# Patient Record
Sex: MALE
[De-identification: names, ages, dates, MRNs, and addresses within clinical notes are randomized per-mention and may not be internally consistent; named-entity substitution may affect disease eponyms.]

---

## 2017-03-21 ENCOUNTER — LAB SERVICES (OUTPATIENT)
Dept: OTHER | Age: 22
End: 2017-03-21

## 2017-03-21 ENCOUNTER — CHARTING TRANS (OUTPATIENT)
Dept: OTHER | Age: 22
End: 2017-03-21

## 2017-03-21 LAB — RAPID STREP GROUP A: POSITIVE

## 2018-11-05 VITALS
DIASTOLIC BLOOD PRESSURE: 77 MMHG | BODY MASS INDEX: 24.79 KG/M2 | WEIGHT: 210 LBS | SYSTOLIC BLOOD PRESSURE: 134 MMHG | HEIGHT: 77 IN | HEART RATE: 60 BPM | TEMPERATURE: 98.5 F | RESPIRATION RATE: 16 BRPM

## 2024-05-01 PROBLEM — Z00.00 ENCOUNTER FOR PREVENTIVE HEALTH EXAMINATION: Status: ACTIVE | Noted: 2024-05-01

## 2024-05-02 ENCOUNTER — NON-APPOINTMENT (OUTPATIENT)
Age: 29
End: 2024-05-02

## 2024-05-02 ENCOUNTER — APPOINTMENT (OUTPATIENT)
Dept: OTOLARYNGOLOGY | Facility: CLINIC | Age: 29
End: 2024-05-02
Payer: COMMERCIAL

## 2024-05-02 VITALS
WEIGHT: 220 LBS | HEIGHT: 77 IN | DIASTOLIC BLOOD PRESSURE: 66 MMHG | SYSTOLIC BLOOD PRESSURE: 121 MMHG | HEART RATE: 53 BPM | BODY MASS INDEX: 25.98 KG/M2 | TEMPERATURE: 97.9 F | OXYGEN SATURATION: 97 %

## 2024-05-02 DIAGNOSIS — Z78.9 OTHER SPECIFIED HEALTH STATUS: ICD-10-CM

## 2024-05-02 DIAGNOSIS — F17.200 NICOTINE DEPENDENCE, UNSPECIFIED, UNCOMPLICATED: ICD-10-CM

## 2024-05-02 DIAGNOSIS — H92.02 OTALGIA, LEFT EAR: ICD-10-CM

## 2024-05-02 DIAGNOSIS — H69.92 UNSPECIFIED EUSTACHIAN TUBE DISORDER, LEFT EAR: ICD-10-CM

## 2024-05-02 PROCEDURE — 31231 NASAL ENDOSCOPY DX: CPT

## 2024-05-02 PROCEDURE — 99204 OFFICE O/P NEW MOD 45 MIN: CPT | Mod: 25

## 2024-05-02 RX ORDER — FLUTICASONE PROPIONATE 50 UG/1
50 SPRAY, METERED NASAL
Qty: 16 | Refills: 3 | Status: ACTIVE | COMMUNITY
Start: 2024-05-02 | End: 1900-01-01

## 2024-05-02 NOTE — PROCEDURE
[FreeTextEntry6] : - Procedure Note    Pre-operative Diagnosis: ear clogging, worse on the left Post-operative Diagnosis: normal exam Anesthesia: Topical Procedure: Bilateral nasal endoscopy    Procedure Details:  After topical anesthesia and decongestant, the patient was placed in the supine position. The telescope was passed along the left nasal floor to the nasopharynx. It was then passed into the region of the middle meatus, middle turbinate, and the sphenoethmoid region.  An identical procedure was performed on the right side.     Findings:  Mucosa: 	                normal	 Nasal septum: 	midline 	 Discharge: 	none	 Turbinates: 	normal	 Adenoid: 	                normal	 Posterior choanae: 	normal	 Eustachian tubes: 	normal	 Mucous stranding: 	normal 	 Lesions: 	                Not present	    Comments:  Condition: Stable. Patient tolerated procedure well. Complications: None

## 2024-05-02 NOTE — ASSESSMENT
[FreeTextEntry1] : - 5/2/24 I believe that the symptoms are consistent with eustachian tube dysfunction.   Eustachian tube dysfunction handout given.  At this time I am recommending nasal saline irrigation as well as nasal steroids x 4 weeks.  I am also recommending my "fly and dive" instructions for acute nasal decongesting x 48 hours.    After 4 weeks I am recommending audiogram and tympanogram.  Patient will follow-up after to review those results.  - Eustachian tube dysfunction handout given - Nasal saline, nasal steroids - "Fly and dive" instructions for acute nasal decongestant - audiogram, tympanogram, in 4 weeks - Follow-up after the above, sooner should symptoms worsen or fail to improve

## 2024-05-02 NOTE — HISTORY OF PRESENT ILLNESS
[de-identified] : - 5/2/24 27 yo male who presents with issues on the left ear.  He is a musician and wears ear plugs, but always feels that there is pressure in the left side with hearing loss as well.  These symptoms are present for a long time, but recently worse.  +Tinnitus bilaterally, worse on the left.  No vertiginous symptoms.  Mild pressure type pain on the left.  No drainage or otorrhea.  No qtip use.  +loud noise exposure from music.  No recent ENT issues.  No recent dental work. good sleep, no new stress - typically high stress regardless, 2 cups caffiene daily, 3 alcoholic drinks per week.

## 2024-05-02 NOTE — REASON FOR VISIT
[Initial Consultation] : an initial consultation for [FreeTextEntry2] : Ear discomfort, hearing loss